# Patient Record
Sex: MALE | Race: BLACK OR AFRICAN AMERICAN | NOT HISPANIC OR LATINO | URBAN - METROPOLITAN AREA
[De-identification: names, ages, dates, MRNs, and addresses within clinical notes are randomized per-mention and may not be internally consistent; named-entity substitution may affect disease eponyms.]

---

## 2017-03-13 ENCOUNTER — EMERGENCY (EMERGENCY)
Facility: HOSPITAL | Age: 57
LOS: 1 days | Discharge: HOME | End: 2017-03-13
Attending: EMERGENCY MEDICINE | Admitting: EMERGENCY MEDICINE
Payer: MEDICAID

## 2017-03-13 VITALS
OXYGEN SATURATION: 100 % | TEMPERATURE: 97 F | DIASTOLIC BLOOD PRESSURE: 83 MMHG | HEART RATE: 76 BPM | RESPIRATION RATE: 17 BRPM | WEIGHT: 190.04 LBS | SYSTOLIC BLOOD PRESSURE: 142 MMHG | HEIGHT: 75 IN

## 2017-03-13 PROCEDURE — 99283 EMERGENCY DEPT VISIT LOW MDM: CPT

## 2017-03-13 RX ORDER — FLUCONAZOLE 150 MG/1
150 TABLET ORAL ONCE
Qty: 0 | Refills: 0 | Status: COMPLETED | OUTPATIENT
Start: 2017-03-13 | End: 2017-03-13

## 2017-03-13 RX ADMIN — FLUCONAZOLE 150 MILLIGRAM(S): 150 TABLET ORAL at 12:58

## 2017-03-13 NOTE — ED PROVIDER NOTE - MEDICAL DECISION MAKING DETAILS
Patient here with apparent chaffing and local skin irritation with likely tinea component 2/2 wearing urine soaked and crusted underwear. Will wash legs, provided new underwear, barrier cream and  rx antifungal cream and diflucan x1 here.

## 2017-03-13 NOTE — ED PROVIDER NOTE - OBJECTIVE STATEMENT
Patient here with rash between his legs for a few days. He is worried that it is herpes. Patient is staying at a shelter in the [place] on/off and is trying to quit EtOH, cocaine/crack and MJ. Also smokes MJ.

## 2017-03-17 DIAGNOSIS — Z79.899 OTHER LONG TERM (CURRENT) DRUG THERAPY: ICD-10-CM

## 2017-03-17 DIAGNOSIS — F17.200 NICOTINE DEPENDENCE, UNSPECIFIED, UNCOMPLICATED: ICD-10-CM

## 2017-03-17 DIAGNOSIS — R21 RASH AND OTHER NONSPECIFIC SKIN ERUPTION: ICD-10-CM

## 2017-03-17 DIAGNOSIS — B35.6 TINEA CRURIS: ICD-10-CM

## 2017-03-17 DIAGNOSIS — Z79.2 LONG TERM (CURRENT) USE OF ANTIBIOTICS: ICD-10-CM

## 2017-03-20 ENCOUNTER — EMERGENCY (EMERGENCY)
Facility: HOSPITAL | Age: 57
LOS: 1 days | Discharge: HOME | End: 2017-03-20
Attending: EMERGENCY MEDICINE | Admitting: EMERGENCY MEDICINE
Payer: MEDICAID

## 2017-03-20 VITALS
DIASTOLIC BLOOD PRESSURE: 90 MMHG | RESPIRATION RATE: 16 BRPM | HEART RATE: 99 BPM | TEMPERATURE: 98 F | SYSTOLIC BLOOD PRESSURE: 154 MMHG | OXYGEN SATURATION: 98 %

## 2017-03-20 DIAGNOSIS — R21 RASH AND OTHER NONSPECIFIC SKIN ERUPTION: ICD-10-CM

## 2017-03-20 DIAGNOSIS — B35.4 TINEA CORPORIS: ICD-10-CM

## 2017-03-20 DIAGNOSIS — Z79.899 OTHER LONG TERM (CURRENT) DRUG THERAPY: ICD-10-CM

## 2017-03-20 PROCEDURE — 99283 EMERGENCY DEPT VISIT LOW MDM: CPT

## 2017-03-20 NOTE — ED PROVIDER NOTE - MEDICAL DECISION MAKING DETAILS
undomiciled male, here 1 week ago for tinea, received rx antifungal but lost it, here c/o similar sx, advised good hygiene, will rx antifungal, f/u derm

## 2017-03-26 ENCOUNTER — EMERGENCY (EMERGENCY)
Facility: HOSPITAL | Age: 57
LOS: 1 days | Discharge: HOME | End: 2017-03-26
Attending: EMERGENCY MEDICINE | Admitting: EMERGENCY MEDICINE
Payer: SELF-PAY

## 2017-03-26 VITALS
SYSTOLIC BLOOD PRESSURE: 137 MMHG | OXYGEN SATURATION: 99 % | RESPIRATION RATE: 18 BRPM | DIASTOLIC BLOOD PRESSURE: 86 MMHG | HEART RATE: 83 BPM | TEMPERATURE: 98 F

## 2017-03-26 DIAGNOSIS — R05 COUGH: ICD-10-CM

## 2017-03-26 DIAGNOSIS — B34.9 VIRAL INFECTION, UNSPECIFIED: ICD-10-CM

## 2017-03-26 PROCEDURE — 99284 EMERGENCY DEPT VISIT MOD MDM: CPT

## 2017-03-26 PROCEDURE — 71020: CPT | Mod: 26

## 2017-03-26 NOTE — ED PROVIDER NOTE - PROGRESS NOTE DETAILS
The scribe's documentation has been prepared under my direction and personally reviewed by me in its entirety. I confirm that the note above accurately reflects all work, treatment, procedures, and medical decision making performed by me.  KIRSTY Mcduffie

## 2017-03-26 NOTE — ED PROVIDER NOTE - OBJECTIVE STATEMENT
56y M Pt with PMHx of psych disorder presents to ED c/o productive cough x4 days. Associated symptoms include sore throat, body aches, and chills. Denies chest pain, SOB, and rash. No recent travel. No sick contact exposure. 56y M Pt with PMHx of psych disorder presents to ED c/o productive cough x4 days. Associated symptoms include sore throat, body aches, and chills. no fever. Denies chest pain, SOB, and rash. No recent travel. No sick contact exposure.

## 2017-03-26 NOTE — ED PROVIDER NOTE - NS ED MD SCRIBE ATTENDING SCRIBE SECTIONS
HISTORY OF PRESENT ILLNESS/INTAKE ASSESSMENT/SCREENINGS/HIV/REVIEW OF SYSTEMS/VITAL SIGNS( Pullset)/PHYSICAL EXAM/PAST MEDICAL/SURGICAL/SOCIAL HISTORY

## 2017-03-29 ENCOUNTER — EMERGENCY (EMERGENCY)
Facility: HOSPITAL | Age: 57
LOS: 1 days | Discharge: HOME | End: 2017-03-29
Attending: EMERGENCY MEDICINE | Admitting: EMERGENCY MEDICINE
Payer: MEDICAID

## 2017-03-29 VITALS
SYSTOLIC BLOOD PRESSURE: 142 MMHG | TEMPERATURE: 97 F | DIASTOLIC BLOOD PRESSURE: 82 MMHG | OXYGEN SATURATION: 98 % | HEART RATE: 85 BPM | RESPIRATION RATE: 16 BRPM

## 2017-03-29 VITALS
HEART RATE: 85 BPM | SYSTOLIC BLOOD PRESSURE: 142 MMHG | RESPIRATION RATE: 16 BRPM | DIASTOLIC BLOOD PRESSURE: 82 MMHG | OXYGEN SATURATION: 98 % | TEMPERATURE: 98 F

## 2017-03-29 DIAGNOSIS — M54.5 LOW BACK PAIN: ICD-10-CM

## 2017-03-29 DIAGNOSIS — F17.200 NICOTINE DEPENDENCE, UNSPECIFIED, UNCOMPLICATED: ICD-10-CM

## 2017-03-29 DIAGNOSIS — Z79.899 OTHER LONG TERM (CURRENT) DRUG THERAPY: ICD-10-CM

## 2017-03-29 PROCEDURE — 99283 EMERGENCY DEPT VISIT LOW MDM: CPT

## 2017-03-29 RX ORDER — IBUPROFEN 200 MG
1 TABLET ORAL
Qty: 40 | Refills: 0 | OUTPATIENT
Start: 2017-03-29 | End: 2017-04-08

## 2017-03-29 RX ORDER — IBUPROFEN 200 MG
600 TABLET ORAL ONCE
Qty: 0 | Refills: 0 | Status: COMPLETED | OUTPATIENT
Start: 2017-03-29 | End: 2017-03-29

## 2017-03-29 RX ADMIN — Medication 600 MILLIGRAM(S): at 12:57

## 2017-03-29 NOTE — ED PROVIDER NOTE - OBJECTIVE STATEMENT
Pt is 57 yo male with pmhx of schizophrenia, herpes presents with lower back pain x 1 year.  Pt reports pain worse on moving from sitting to standing, and is worse with cold weather.  Pt denies any falls or direct trauma to back.  Pt denies any numbness, weakness, or tingling to extremities.  Pt denies any bowel or bladder dysfunction, saddle anesthesia.  Pt reports pain improved with walking.

## 2017-03-29 NOTE — ED PROVIDER NOTE - CHPI ED SYMPTOMS NEG
no bowel dysfunction/no numbness/no neck tenderness/no constipation/no tingling/no bladder dysfunction/no difficulty bearing weight/no motor function loss

## 2017-03-29 NOTE — ED PROVIDER NOTE - MEDICAL DECISION MAKING DETAILS
Pt with nonprogressive lower back pain x 1 year and worse with positional change and cold weather.  adivsed rest, ibprofen and pmd f/u.  pt with nonfocal exam and no concerning surgical signs for concern for spinal chord injury

## 2017-08-27 ENCOUNTER — EMERGENCY (EMERGENCY)
Facility: HOSPITAL | Age: 57
LOS: 1 days | Discharge: HOME | End: 2017-08-27
Attending: EMERGENCY MEDICINE | Admitting: EMERGENCY MEDICINE
Payer: MEDICAID

## 2017-08-27 VITALS
HEART RATE: 77 BPM | TEMPERATURE: 98 F | RESPIRATION RATE: 18 BRPM | DIASTOLIC BLOOD PRESSURE: 80 MMHG | SYSTOLIC BLOOD PRESSURE: 133 MMHG | OXYGEN SATURATION: 98 %

## 2017-08-27 PROCEDURE — 99283 EMERGENCY DEPT VISIT LOW MDM: CPT | Mod: 25

## 2017-08-27 RX ORDER — NYSTATIN/TRIAMCINOLONE ACET
1 OINTMENT (GRAM) TOPICAL THREE TIMES A DAY
Qty: 0 | Refills: 0 | Status: DISCONTINUED | OUTPATIENT
Start: 2017-08-27 | End: 2017-08-27

## 2017-08-27 RX ORDER — FLUCONAZOLE 150 MG/1
150 TABLET ORAL ONCE
Qty: 0 | Refills: 0 | Status: COMPLETED | OUTPATIENT
Start: 2017-08-27 | End: 2017-08-27

## 2017-08-27 RX ADMIN — FLUCONAZOLE 150 MILLIGRAM(S): 150 TABLET ORAL at 02:20

## 2017-08-27 NOTE — ED PROVIDER NOTE - OBJECTIVE STATEMENT
55 yo male with c/o rash in groin for 2 days. Described as itchy. has not applied any otc meds. Denies fever/chills/spreading.

## 2017-08-31 DIAGNOSIS — R21 RASH AND OTHER NONSPECIFIC SKIN ERUPTION: ICD-10-CM

## 2017-08-31 DIAGNOSIS — Z79.899 OTHER LONG TERM (CURRENT) DRUG THERAPY: ICD-10-CM

## 2017-08-31 DIAGNOSIS — Z79.1 LONG TERM (CURRENT) USE OF NON-STEROIDAL ANTI-INFLAMMATORIES (NSAID): ICD-10-CM

## 2017-08-31 DIAGNOSIS — B37.9 CANDIDIASIS, UNSPECIFIED: ICD-10-CM

## 2017-08-31 DIAGNOSIS — F17.210 NICOTINE DEPENDENCE, CIGARETTES, UNCOMPLICATED: ICD-10-CM

## 2017-09-11 NOTE — ED PROVIDER NOTE - SEVERITY
Lab and Procedure Follow Up for OB Patients    Communication and follow up of lab work or procedures are important to us. We believe in sharing your results with you promptly. Joining [employer] is the fastest way to receive your results.     To sign up for [employer] account:  ? Log on to [email or web address]  ? Click on new user-sign up now  ? Please fill out the Identify Yourself and follow the directions  ? Or please talk with a Patient  (PSR) for assistance    If you are not participating in [employer], you will receive your results at your next appointment if normal or by telephone if there is a message for you from your provider.  Please contact the office at [phone] with any questions or concerns.     
MILD

## 2017-10-10 ENCOUNTER — EMERGENCY (EMERGENCY)
Facility: HOSPITAL | Age: 57
LOS: 1 days | Discharge: HOME | End: 2017-10-10
Admitting: EMERGENCY MEDICINE
Payer: MEDICAID

## 2017-10-10 VITALS
SYSTOLIC BLOOD PRESSURE: 126 MMHG | TEMPERATURE: 99 F | RESPIRATION RATE: 18 BRPM | DIASTOLIC BLOOD PRESSURE: 74 MMHG | OXYGEN SATURATION: 99 % | HEART RATE: 72 BPM
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
## 2017-10-10 DIAGNOSIS — M54.5 LOW BACK PAIN: ICD-10-CM

## 2017-10-10 DIAGNOSIS — S39.012A STRAIN OF MUSCLE, FASCIA AND TENDON OF LOWER BACK, INITIAL ENCOUNTER: ICD-10-CM
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
## 2017-10-10 PROCEDURE — 99284 EMERGENCY DEPT VISIT MOD MDM: CPT

## 2017-10-10 RX ORDER — IBUPROFEN 200 MG
600 TABLET ORAL ONCE
Qty: 0 | Refills: 0 | Status: COMPLETED | OUTPATIENT
Start: 2017-10-10 | End: 2017-10-10

## 2017-10-10 RX ORDER — ACETAMINOPHEN 500 MG
650 TABLET ORAL ONCE
Qty: 0 | Refills: 0 | Status: COMPLETED | OUTPATIENT
Start: 2017-10-10 | End: 2017-10-10

## 2017-10-10 RX ADMIN — Medication 650 MILLIGRAM(S): at 19:26

## 2017-10-10 RX ADMIN — Medication 600 MILLIGRAM(S): at 19:26

## 2017-10-10 NOTE — ED PROVIDER NOTE - MEDICAL DECISION MAKING DETAILS
pt. with reproducible lower back pain , no LE weakness, no bowel /bladder dysfunction ,vss, exam consistent with MS pain . motrin /Tylenol stable for d/c,

## 2017-11-24 ENCOUNTER — EMERGENCY (EMERGENCY)
Facility: HOSPITAL | Age: 57
LOS: 1 days | Discharge: HOME | End: 2017-11-24
Attending: EMERGENCY MEDICINE | Admitting: EMERGENCY MEDICINE
Payer: MEDICAID

## 2017-11-24 VITALS
DIASTOLIC BLOOD PRESSURE: 78 MMHG | RESPIRATION RATE: 18 BRPM | OXYGEN SATURATION: 96 % | SYSTOLIC BLOOD PRESSURE: 146 MMHG | TEMPERATURE: 98 F
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
## 2017-11-24 DIAGNOSIS — S70.312A ABRASION, LEFT THIGH, INITIAL ENCOUNTER: ICD-10-CM
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
## 2017-11-24 DIAGNOSIS — R35.0 FREQUENCY OF MICTURITION: ICD-10-CM
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
## 2017-11-24 DIAGNOSIS — Z79.899 OTHER LONG TERM (CURRENT) DRUG THERAPY: ICD-10-CM

## 2017-11-24 DIAGNOSIS — Z79.1 LONG TERM (CURRENT) USE OF NON-STEROIDAL ANTI-INFLAMMATORIES (NSAID): ICD-10-CM

## 2017-11-24 DIAGNOSIS — B35.6 TINEA CRURIS: ICD-10-CM
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
## 2017-11-24 PROCEDURE — 99283 EMERGENCY DEPT VISIT LOW MDM: CPT

## 2017-11-24 RX ORDER — FLUCONAZOLE 150 MG/1
150 TABLET ORAL ONCE
Qty: 0 | Refills: 0 | Status: COMPLETED | OUTPATIENT
Start: 2017-11-24 | End: 2017-11-24

## 2017-11-24 RX ORDER — KETOCONAZOLE 20 MG/G
1 AEROSOL, FOAM TOPICAL
Qty: 30 | Refills: 0 | OUTPATIENT
Start: 2017-11-24 | End: 2017-12-08

## 2017-11-24 RX ORDER — FLUCONAZOLE 150 MG/1
1 TABLET ORAL
Qty: 3 | Refills: 0 | OUTPATIENT
Start: 2017-11-24 | End: 2017-12-15

## 2017-11-24 RX ADMIN — FLUCONAZOLE 150 MILLIGRAM(S): 150 TABLET ORAL at 10:49

## 2017-11-24 NOTE — ED PROVIDER NOTE - CONSTITUTIONAL, MLM
normal... Pt is unkempt and disheveled, but oriented to person, place, time/situation and in no apparent distress.

## 2017-11-24 NOTE — ED PROVIDER NOTE - OBJECTIVE STATEMENT
56 yo M who admits to being homeless and living in shelter presents c/o sore to left interior thigh. Pt states has fungal infection between legs which has worsened over the past few days. Pt notes frequently urinates on self due to lack of restrooms at night. Denies fever or chills. Pt also notes has been having unprotected sex recently and is concerned of exposure. 58 yo M who admits to being homeless and living in shelter presents c/o sore to left interior thigh. Pt states has fungal infection between legs which has worsened over the past few days. Pt notes frequently urinates on self due to lack of restrooms at night (pt is undomiciled). Denies fever or chills. Pt also notes has been having unprotected sex recently and is concerned of exposure. Old chart review shows visit for same in August 2017.

## 2017-11-24 NOTE — ED PROVIDER NOTE - MUSCULOSKELETAL, MLM
Spine appears normal, range of motion is not limited, no joint tenderness. Tenderness to wound site in the left medial upper thigh.

## 2017-11-24 NOTE — ED PROVIDER NOTE - GENITOURINARY, MLM
External genitalia is normal. Normal circumcised penis. External genitalia is normal. Normal circumcised penis. normal scrotum.

## 2017-11-24 NOTE — ED PROVIDER NOTE - SKIN, MLM
0.5cm excoriated abrasion in the left medial upper thigh with no significant erythema or fluctuance. Dryness of perianal area with white excoriation.

## 2017-11-24 NOTE — ED PROVIDER NOTE - MEDICAL DECISION MAKING DETAILS
Pt presents with excoriated abrasion in left medial upper thigh. Will treat as inguinal fungal infection with Doxycycline for high risk of exposure. Do not suspect wound to be syphilis chancre. Pt presents with excoriated abrasion in left medial upper thigh. Will treat as inguinal fungal infection with Doxycycline for high risk of exposure (unprotected sex). Do not suspect wound to be syphilis chancre.

## 2018-02-26 ENCOUNTER — EMERGENCY (EMERGENCY)
Facility: HOSPITAL | Age: 58
LOS: 1 days | Discharge: HOME | End: 2018-02-26
Attending: EMERGENCY MEDICINE | Admitting: EMERGENCY MEDICINE
Payer: MEDICAID

## 2018-02-26 VITALS
HEART RATE: 81 BPM | OXYGEN SATURATION: 100 % | SYSTOLIC BLOOD PRESSURE: 137 MMHG | TEMPERATURE: 98 F | DIASTOLIC BLOOD PRESSURE: 84 MMHG | RESPIRATION RATE: 18 BRPM

## 2018-02-26 DIAGNOSIS — L03.115 CELLULITIS OF RIGHT LOWER LIMB: ICD-10-CM

## 2018-02-26 DIAGNOSIS — L03.116 CELLULITIS OF LEFT LOWER LIMB: ICD-10-CM

## 2018-02-26 DIAGNOSIS — L98.9 DISORDER OF THE SKIN AND SUBCUTANEOUS TISSUE, UNSPECIFIED: ICD-10-CM

## 2018-02-26 DIAGNOSIS — Z79.899 OTHER LONG TERM (CURRENT) DRUG THERAPY: ICD-10-CM

## 2018-02-26 PROCEDURE — 99283 EMERGENCY DEPT VISIT LOW MDM: CPT

## 2018-02-26 RX ORDER — AZTREONAM 2 G
1 VIAL (EA) INJECTION
Qty: 20 | Refills: 0 | OUTPATIENT
Start: 2018-02-26 | End: 2018-03-07

## 2018-02-26 RX ADMIN — Medication 1 TABLET(S): at 15:51

## 2018-02-26 NOTE — ED PROVIDER NOTE - OBJECTIVE STATEMENT
Pt is a 58 y/o M presenting to the ED with c/o sore to the BL legs. Pt reports the sores are located in the inner thighs. He is unsure of how they initially started but notes they have worsened after having urine frequently drip down his legs. Associated redness to the sores. Denies fever and any other sxs. No hx of similar sxs. PMHx of schizophrenia. Pt is a 56 y/o M presenting to the ED with c/o sores to the BL legs. Pt reports the sores are located in the inner thighs. He is unsure of how they initially started but notes they have worsened after having urine frequently drip down his legs. Associated redness to the sores. Denies fever and any other sxs. No hx of similar sxs. PMHx of schizophrenia.

## 2018-02-26 NOTE — ED PROVIDER NOTE - PHYSICAL EXAMINATION
GEN: Well appearing, well nourished, awake, alert, oriented to person, place, time/situation and in no apparent distress.  ENT: Airway patent, Nasal mucosa clear. Mouth with normal mucosa.  EYES: Clear bilaterally.  RESPIRATORY: Breathing comfortably with normal RR.  MSK: Range of motion is not limited, no deformities noted.  NEURO: Alert and oriented, no focal deficits.  SKIN: Skin normal color for race, warm, dry. Redness and superficial abrasions to the BL inner thighs, L > R.   PSYCH: Alert and oriented to person, place, time/situation. normal mood and affect. no apparent risk to self or others.

## 2018-05-16 ENCOUNTER — EMERGENCY (EMERGENCY)
Facility: HOSPITAL | Age: 58
LOS: 1 days | Discharge: HOME | End: 2018-05-16
Admitting: EMERGENCY MEDICINE
Payer: MEDICAID

## 2018-05-16 VITALS
RESPIRATION RATE: 20 BRPM | HEART RATE: 77 BPM | SYSTOLIC BLOOD PRESSURE: 123 MMHG | OXYGEN SATURATION: 99 % | TEMPERATURE: 98 F | DIASTOLIC BLOOD PRESSURE: 65 MMHG

## 2018-05-16 DIAGNOSIS — L29.9 PRURITUS, UNSPECIFIED: ICD-10-CM

## 2018-05-16 DIAGNOSIS — Z79.899 OTHER LONG TERM (CURRENT) DRUG THERAPY: ICD-10-CM

## 2018-05-16 DIAGNOSIS — Z79.2 LONG TERM (CURRENT) USE OF ANTIBIOTICS: ICD-10-CM

## 2018-05-16 PROCEDURE — 99282 EMERGENCY DEPT VISIT SF MDM: CPT | Mod: 25

## 2018-05-16 NOTE — ED PROVIDER NOTE - MEDICAL DECISION MAKING DETAILS
Requesting permethrin. Will give as well as benadryl - exam not consistent with scabies but will cover for it prophylactically

## 2018-05-16 NOTE — ED PROVIDER NOTE - PHYSICAL EXAMINATION
VITAL SIGNS: I have reviewed nursing notes and confirm.  CONSTITUTIONAL: Well-developed; well-nourished; in no acute distress.  SKIN: Skin is warm and dry, no acute rash. No sign of insect bites or infestation  HEAD: Normocephalic; atraumatic.  EYES: PERRL, EOM intact; conjunctiva and sclera clear.  ENT: No nasal discharge; airway clear.  NECK: Supple; non tender.  CARD: S1, S2 normal; no murmurs, gallops, or rubs. Regular rate and rhythm.  RESP: No wheezes, rales or rhonchi.  ABD: Normal bowel sounds; soft; non-distended; non-tender; no hepatosplenomegaly.  EXT: Normal ROM. No clubbing, cyanosis or edema.  NEURO: Alert, oriented. Grossly unremarkable.  PSYCH: Cooperative, appropriate.

## 2018-05-16 NOTE — ED PROVIDER NOTE - OBJECTIVE STATEMENT
58 y/o M     pmhx: schizophrenia  undomeciled    Pt presents to ER stating he feels like he has scabies or lice. States he is itchy all over. Denies fevers chills

## 2019-06-09 ENCOUNTER — EMERGENCY (EMERGENCY)
Facility: HOSPITAL | Age: 59
LOS: 1 days | Discharge: HOME | End: 2019-06-09
Attending: EMERGENCY MEDICINE | Admitting: EMERGENCY MEDICINE
Payer: MEDICAID

## 2019-06-09 VITALS
OXYGEN SATURATION: 98 % | TEMPERATURE: 98 F | SYSTOLIC BLOOD PRESSURE: 128 MMHG | WEIGHT: 192.02 LBS | HEART RATE: 69 BPM | DIASTOLIC BLOOD PRESSURE: 78 MMHG | RESPIRATION RATE: 18 BRPM

## 2019-06-09 PROBLEM — F14.10 COCAINE ABUSE, UNCOMPLICATED: Chronic | Status: ACTIVE | Noted: 2017-03-13

## 2019-06-09 PROBLEM — F20.9 SCHIZOPHRENIA, UNSPECIFIED: Chronic | Status: ACTIVE | Noted: 2018-02-26

## 2019-06-09 PROBLEM — F10.10 ALCOHOL ABUSE, UNCOMPLICATED: Chronic | Status: ACTIVE | Noted: 2017-03-13

## 2019-06-09 PROCEDURE — 73630 X-RAY EXAM OF FOOT: CPT | Mod: 26,RT

## 2019-06-09 PROCEDURE — 99283 EMERGENCY DEPT VISIT LOW MDM: CPT | Mod: 25

## 2019-06-09 RX ORDER — AMMONIUM LACTATE/UREA 10 %-20 %
1 CREAM (GRAM) TOPICAL
Qty: 1 | Refills: 0
Start: 2019-06-09 | End: 2019-06-22

## 2019-06-09 RX ORDER — IBUPROFEN 200 MG
600 TABLET ORAL ONCE
Refills: 0 | Status: COMPLETED | OUTPATIENT
Start: 2019-06-09 | End: 2019-06-09

## 2019-06-09 RX ADMIN — Medication 600 MILLIGRAM(S): at 16:44

## 2019-06-09 NOTE — ED PROVIDER NOTE - NSFOLLOWUPINSTRUCTIONS_ED_ALL_ED_FT
Contusion    A contusion is a deep bruise. Contusions are the result of a blunt injury to tissues and muscle fibers under the skin. The skin overlying the contusion may turn blue, purple, or yellow. Symptoms also include pain and swelling in the injured area.    SEEK IMMEDIATE MEDICAL CARE IF YOU HAVE ANY OF THE FOLLOWING SYMPTOMS: severe pain, numbness, tingling, pain, weakness, or skin color/temperature change in any part of your body distal to the injury.     Bunion  Image   A bunion is a bump on the base of the big toe that forms when the bones of the big toe joint move out of position. Bunions may be small at first, but they often get larger over time. The can make walking painful.    What are the causes?  A bunion may be caused by:  Wearing narrow or pointed shoes that force the big toe to press against the other toes.  Abnormal foot development that causes the foot to roll inward (pronate).  Changes in the foot that are caused by certain diseases, such as rheumatoid arthritis and polio.  A foot injury.  What increases the risk?  The following factors may make you more likely to develop this condition:  Wearing shoes that squeeze the toes together.  Having certain diseases, such as:  Rheumatoid arthritis.  Polio.  Cerebral palsy.  Having family members who have bunions.  Being born with a foot deformity, such as flat feet or low arches.  Doing activities that put a lot of pressure on the feet, such as ballet dancing.  What are the signs or symptoms?  The main symptom of a bunion is a noticeable bump on the big toe. Other symptoms may include:  Pain.  Swelling around the big toe.  Redness and inflammation.  Thick or hardened skin on the big toe or between the toes.  Stiffness or loss of motion in the big toe.  Trouble with walking.  How is this diagnosed?  A bunion may be diagnosed based on your symptoms, medical history, and activities. You may have tests, such as:  X-rays. These allow your health care provider to check the position of the bones in your foot and look for damage to your joint. They also help your health care provider to determine the severity of your bunion and the best way to treat it.  Joint aspiration. In this test, a sample of fluid is removed from the toe joint. This test, which may be done if you are in a lot of pain, helps to rule out diseases that cause painful swelling of the joints, such as arthritis.  How is this treated?  There is no cure for a bunion, but treatment can help to prevent a bunion from getting worse. Treatment depends on the severity of your symptoms. Your health care provider may recommend:  Wearing shoes that have a wide toe box.  Using bunion pads to cushion the affected area.  Taping your toes together to keep them in a normal position.  Placing a device inside your shoe (orthotics) to help reduce pressure on your toe joint.  Taking medicine to ease pain, inflammation, and swelling.  Applying heat or ice to the affected area.  Doing stretching exercises.  Surgery to remove scar tissue and move the toes back into their normal position. This treatment is rare.  Follow these instructions at home:  Support your toe joint with proper footwear, shoe padding, or taping as told by your health care provider.  Take over-the-counter and prescription medicines only as told by your health care provider.  If directed, apply ice to the injured area:  Put ice in a plastic bag.  Place a towel between your skin and the bag.  Leave the ice on for 20 minutes, 2–3 times per day.  If directed, apply heat to the affected area before you exercise. Use the heat source that your health care provider recommends, such as a moist heat pack or a heating pad.  Place a towel between your skin and the heat source.  Leave the heat on for 20–30 minutes.  Remove the heat if your skin turns bright red. This is especially important if you are unable to feel pain, heat, or cold. You may have a greater risk of getting burned.  Do exercises as told by your health care provider.  Keep all follow-up visits as told by your health care provider.  Contact a health care provider if:  Your symptoms get worse.  Your symptoms do not improve in 2 weeks.  Get help right away if:  You have severe pain and trouble with walking.  This information is not intended to replace advice given to you by your health care provider. Make sure you discuss any questions you have with your health care provider.

## 2019-06-09 NOTE — ED PROVIDER NOTE - CLINICAL SUMMARY MEDICAL DECISION MAKING FREE TEXT BOX
57 y/o Male present with bunion pain, acute onset chronic after recent traumatic injury. No signs of infection. Plan: X-ray, NSAIDS and outpt pediatry follow-up. 59 y/o Male present with bunion pain, acute onset chronic after recent traumatic injury. No signs of infection. Plan: X-ray, NSAIDS and outpt podiatry follow-up.

## 2019-06-09 NOTE — ED PROVIDER NOTE - MUSCULOSKELETAL, MLM
Bunion present with dried slight tender skin to the medial aspect R big toe. No redness or warmth or discharge. Normal pulse.

## 2019-06-09 NOTE — ED PROVIDER NOTE - OBJECTIVE STATEMENT
57 y/o Male with PMHx of risaion CHRISTINA, presents to the ED with his large luggage c/o Right big toe pain and swelling after recent trauma x2 days. Pt attributes the pain to build up of dried dead skin. Notes he takes psych medications. Denies numbness, tingling, fever, and chills.

## 2019-06-09 NOTE — ED PROVIDER NOTE - NSFOLLOWUPCLINICS_GEN_ALL_ED_FT
[facility] - Podiatry Clinic  Podiatry  [address] [place] [address]  Phone: [phone]  Fax:   Follow Up Time: 7-10 Days

## 2019-06-13 DIAGNOSIS — M79.671 PAIN IN RIGHT FOOT: ICD-10-CM

## 2019-06-13 DIAGNOSIS — M21.611 BUNION OF RIGHT FOOT: ICD-10-CM

## 2020-06-28 ENCOUNTER — EMERGENCY (EMERGENCY)
Facility: HOSPITAL | Age: 60
LOS: 1 days | Discharge: HOME | End: 2020-06-28
Admitting: EMERGENCY MEDICINE
Payer: MEDICAID

## 2020-06-28 VITALS
TEMPERATURE: 98 F | SYSTOLIC BLOOD PRESSURE: 140 MMHG | RESPIRATION RATE: 18 BRPM | HEART RATE: 66 BPM | OXYGEN SATURATION: 97 % | WEIGHT: 179.9 LBS | HEIGHT: 72 IN | DIASTOLIC BLOOD PRESSURE: 78 MMHG

## 2020-06-28 DIAGNOSIS — M21.611 BUNION OF RIGHT FOOT: ICD-10-CM

## 2020-06-28 DIAGNOSIS — M79.671 PAIN IN RIGHT FOOT: ICD-10-CM

## 2020-06-28 PROBLEM — M21.619 BUNION OF UNSPECIFIED FOOT: Chronic | Status: ACTIVE | Noted: 2019-06-09

## 2020-06-28 PROCEDURE — 99283 EMERGENCY DEPT VISIT LOW MDM: CPT

## 2020-06-28 RX ORDER — NYSTATIN CREAM 100000 [USP'U]/G
1 CREAM TOPICAL ONCE
Refills: 0 | Status: COMPLETED | OUTPATIENT
Start: 2020-06-28 | End: 2020-06-28

## 2020-06-28 RX ORDER — IBUPROFEN 200 MG
1 TABLET ORAL
Qty: 20 | Refills: 0
Start: 2020-06-28

## 2020-06-28 RX ORDER — IBUPROFEN 200 MG
600 TABLET ORAL ONCE
Refills: 0 | Status: COMPLETED | OUTPATIENT
Start: 2020-06-28 | End: 2020-06-28

## 2020-06-28 RX ADMIN — Medication 600 MILLIGRAM(S): at 11:33

## 2020-06-28 RX ADMIN — NYSTATIN CREAM 1 APPLICATION(S): 100000 CREAM TOPICAL at 11:33

## 2020-06-28 NOTE — ED PROVIDER NOTE - CLINICAL SUMMARY MEDICAL DECISION MAKING FREE TEXT BOX
58 y/o M presents to ED requesting medications for his callous and for athletes foot.  Pt given ibuprofen, nystatin powder and referral to podiatry.

## 2020-06-28 NOTE — ED PROVIDER NOTE - OBJECTIVE STATEMENT
60 y/o M presents to ED c/o foot pain.  Pt has a chronic bunion that has formed a callous.  He denies trauma/falls, weakness, numbness, swelling, drainage.  He also states he has been having intermittent itching between his toes and thinks he has athletes foot.  Pt is requesting the medications he received during his last visit.

## 2020-06-28 NOTE — ED PROVIDER NOTE - PATIENT PORTAL LINK FT
You can access the FollowMyHealth Patient Portal offered by [facility] by registering at the following website: [email or web address]. By joining [facility]’s FollowMyHealth portal, you will also be able to view your health information using other applications (apps) compatible with our system.

## 2020-06-28 NOTE — ED PROVIDER NOTE - NSFOLLOWUPINSTRUCTIONS_ED_ALL_ED_FT
-PLEASE FOLLOW-UP WITH YOUR PODIATRY IN 1-2 DAYS.  BRING ALL PAPERWORK FROM TODAY'S VISIT TO YOUR FOLLOW-UP VISIT.  SHE CAN HELP YOU MAKE A FOLLOW-UP APPOINTMENT.    -TAKE OVER THE COUNTER TYLENOL 650MG BY MOUTH EVERY 4-6 HOURS AS NEEDED FOR PAIN.  DO NOT MIX WITH ALCOHOL OR OTHER PRESCRIPTION MEDICATIONS THAT ALREADY CONTAIN TYLENOL OR ACETAMINOPHEN.   -TAKE OVER THE COUNTER IBUPROFEN 400-600MG BY MOUTH EVERY 8 HOURS AS NEEDED FOR PAIN.  BE SURE TO TAKE WITH FOOD OR MILK AS THIS MEDICATION CAN CAUSE STOMACH IRRITATION.  -PLEASE RETURN TO THE EMERGENCY DEPARTMENT IMMEDIATELY OR CALL 911 FOR ANY HIGH FEVER, TROUBLE BREATHING, VOMITING, SEVERE PAIN, OR ANY OTHER CONCERNS.

## 2020-12-23 NOTE — ED ADULT NURSE NOTE - NS ED NURSE LEVEL OF CONSCIOUSNESS ORIENTATION
Oriented - self; Oriented - place; Oriented - time
Patient requests all Lab and Radiology Results on their Discharge Instructions

## 2021-08-02 NOTE — ED ADULT TRIAGE NOTE - MODE OF ARRIVAL
[facility]  For Remicade prescription order for med increase to 600mg, signed/dated by Noe and faxed to [phone].         Walk in

## 2021-08-03 NOTE — ED ADULT NURSE NOTE - FALLEN IN THE PAST
Quality 130: Documentation Of Current Medications In The Medical Record: Current Medications Documented
Detail Level: Detailed
no

## 2021-09-28 NOTE — ED PROVIDER NOTE - OBJECTIVE STATEMENT
Patients home cpap unit set up. Unit intact and clean. Plugged into red outlet   55 yo male homeless, here c/o rash to inner thighs x 10 days, itchy. States he lost his prescription for ketoconazole. No fever, no chills. History of polysubstance abuse.

## 2021-11-23 NOTE — ED ADULT NURSE NOTE - SKIN TEMPERATURE MOISTURE
Oculoplastic Surgeon Procedure Text (A): After obtaining clear surgical margins the patient was sent to oculoplastics for surgical repair.  The patient understands they will receive post-surgical care and follow-up from the referring physician's office. warm

## 2022-10-04 NOTE — ED ADULT NURSE NOTE - NSSISCREENINGQ4_ED_A_ED
Speech Therapy    Swallow Recommendations:  1.  Continue puree solids,  nectar thick liquids (no straws), as tolerated.  2. Constant supervision to ensure tolerance/implementation of safe swallow guidelines  3. Medications crushed with puree  4. Slow rate, small bites, single/small sips, frequent liquid wash, double swallow  5. Discontinue if any increased coughing, throat clear, wet quality, etc.      Visit Type: Treatment  -  Swallow    Precautions     - Medical precautions:  swallowing precautions;.     SUBJECTIVE  Patient alert and cooperative. Per chart review, temperatures/lungs stable. Patient agreed to participate in therapy this date.   Patient/family goals: maximize function     OBJECTIVE      Swallowing   No temperature spike noted.  Patient positioning: upright in bed  Pretrial vocal quality: normalNo pain associated with swallow.    Consistencies:  Nectar Thick Liquid (cup):     - Pharyngeal: impaired, delayed throat clear  Oral/Pharyngeal Exercises   Lingual:    - Repetitions: 10  - Sets: 1  minimal cues (tongue pull)  Olga:    - Repetitions: 10  - Sets: 1  moderate cues  /gah/:    - Repetitions: 10  - Sets: 1  minimal cues  Effortful Swallow:   - Repetitions: 10  - Sets: 1minimal cues             ASSESSMENT  Patient completed oral pharyngeal conditioning- see above for exercises/repetitions.     Patient provided with nectar thick liquids (cup; sips x 2). Patient demonstrated delayed, wet throat clear x 1 (patient completed swallow, initiated laugh, which resulted in wet throat clear- (suspect pharyngeal residuals impacting tolerance).     Oral phase characterized by decreased bolus control/coordination and suspected premature spillage. Swallow initiation appears delayed with significantly reduced hyolaryngeal movement to palpation.     Patient to continue with puree solids, nectar thick liquids and constant supervision.      Speech therapy to continue to focus on oral pharyngeal conditioning, diet  tolerance/safety, determine appropriateness for trials for advancement, patient/family education.     Requires SLP Follow Up: Yes    Discharge Recommendations  SLP Identified Barriers to Discharge: TBD, pending further workup           • Skilled therapy is required to establish safe means of nutrition, hydration and medication administration    Education Provided:   Learning Assessment:  - Primary learner: patient  - Are they ready to learn: yes  - Preferred learning style: verbal  - Barriers to learning: cognitive  Education provided during session:  - Receiving Education: patient  - Patient provided with education regarding current diet, need for thickened liquids and oral pharyngeal conditioning.   - dysphagia, role of SLP and dysarthria  - Results of above outlined education: Needs reinforcement  PLAN  Frequency:  (10/4) 2/5 on 10/9/22; swallow     Speech therapy to continue to focus on oral pharyngeal conditioning, diet tolerance/safety, determine appropriateness for trials for advancement, patient/family education.   Interventions:  Assess tolerance of least restrictive oral diet, patient/family education, oral conditioning exercises and pharyngeal conditioning exercises    Plan/Goal Agreement:  Patient agrees with goals and individualized plan of care    RECOMMENDATIONS     -Diet:          *nectar-thick liquids and puree/dysphagia 1    -Medication Administration:         *crushed and with puree    -Feeding Guidelines:          *allow extra time to swallow, constant supervision, eat slowly only when alert, as tolerated, small bites/sips, stop feeding if coughing observed, alternate solids/liquids and periodic liquid wash    -Speech Reviewed Swallow:         *with patient/family, with clinical caregivers and feeding guidelines posted in room    GOALS  Review Date: 10/11/2022  Short Term Goals:   Patient will complete a VFSS. GOAL MET 9/27    Patient will tolerate nectar thick liquids without complications of  aspiration.     Patient will tolerate pureed solids without complications of dysphagia. - Goal updated 9/30    Patient will tolerate trials for diet advancement without complications of dysphagia.    Patient will complete oral pharyngeal exercises with mod/max verbal cues.     Long Term Goals:   Patient will tolerate a least restrictive diet without complications of aspiration.   Documented in the chart in the following areas: Assessment.      Therapy procedure time and total treatment time can be found documented on the Time Entry flowsheet   No

## 2022-10-31 NOTE — ED ADULT NURSE NOTE - CAS DISCH TRANSFER METHOD
Pt discharged per physician order. Medications and discharge instructions discussed with Patients caregiver and they deny questions at this time. . Pt leaving facility with caregivers.         Austin Valdes RN  10/31/22 [id number]
[employer]ulate/Transportation service

## 2023-09-14 NOTE — ED PROVIDER NOTE - CONSTITUTIONAL, MLM
normal... Well appearing, awake, alert, oriented to person, place, time/situation and in no apparent distress.

## 2024-05-14 NOTE — ED ADULT NURSE NOTE - NS ED NURSE RECORD ANOTHER VITAL SIGN
What Is The Reason For Today's Visit? (Being Monitored For X): concerning skin lesions on an annual basis Yes

## 2025-02-25 NOTE — ED PROVIDER NOTE - DISCUSSED CLINICAL AND RADIOLOGICAL FINDINGS WITH, MDM
Today :We administered abatacept (Orencia) 750 mg in sodium chloride 0.9% 100 mL IV.     For:   1. Seropositive rheumatoid arthritis of multiple joints (Multi)    2. Osteopenia with high risk of fracture         Your next appointment is due in:  3/25/2025        Please read the  Medication Guide that was given to you and reviewed during todays visit.     (Tell all doctors including dentists that you are taking this medication)     Go to the emergency room or call 911 if:  -You have signs of allergic reaction:   -Rash, hives, itching.   -Swollen, blistered, peeling skin.   -Swelling of face, lips, mouth, tongue or throat.   -Tightness of chest, trouble breathing, swallowing or talking     Call your doctor:  - If IV / injection site gets red, warm, swollen, itchy or leaks fluid or pus.     (Leave dressing on your IV site for at least 2 hours and keep area clean and dry  - If you get sick or have symptoms of infection or are not feeling well for any reason.    (Wash your hands often, stay away from people who are sick)  - If you have side effects from your medication that do not go away or are bothersome.     (Refer to the teaching your nurse gave you for side effects to call your doctor about)    - Common side effects may include:  stuffy nose, headache, feeling tired, muscle aches, upset stomach  - Before receiving any vaccines     - Call the [facility] at   If:  - You get sick, are on antibiotics, have had a recent vaccine, have surgery or dental work and your doctor wants your visit rescheduled.  - You need to cancel and reschedule your visit for any reason. Call at least 2 days before your visit if you need to cancel.   - Your insurance changes before your next visit.    (We will need to get approval from your new insurance. This can take up to two weeks.)     The [facility] is opened Monday thru Friday. We are closed on weekends and holidays.   Voice mail will take your call if  the center is closed. If you leave a message please allow 24 hours for a call back during weekdays. If you leave a message on a weekend/holiday, we will call you back the next business day.    A pharmacist is available Monday - Friday from 8:30AM to 3:30PM to help answer any questions you may have about your prescriptions(s). Please call pharmacy at:    [facility]: [phone]  [facility]: [phone]  [facility]: [phone]               patient bilateral

## 2025-04-11 NOTE — ED ADULT NURSE NOTE - HARM RISK FACTORS
Spoke with patient and appointment scheduled in June with Dr. Presley. Details confirmed verbally over phone and lab orders placed. Reminder slip placed in mail.  
no